# Patient Record
Sex: FEMALE | Race: ASIAN | NOT HISPANIC OR LATINO
[De-identification: names, ages, dates, MRNs, and addresses within clinical notes are randomized per-mention and may not be internally consistent; named-entity substitution may affect disease eponyms.]

---

## 2023-08-11 ENCOUNTER — APPOINTMENT (OUTPATIENT)
Dept: ORTHOPEDIC SURGERY | Facility: CLINIC | Age: 48
End: 2023-08-11
Payer: COMMERCIAL

## 2023-08-11 VITALS — BODY MASS INDEX: 32.32 KG/M2 | HEIGHT: 65 IN | WEIGHT: 194 LBS

## 2023-08-11 DIAGNOSIS — S83.412A SPRAIN OF MEDIAL COLLATERAL LIGAMENT OF LEFT KNEE, INITIAL ENCOUNTER: ICD-10-CM

## 2023-08-11 DIAGNOSIS — R73.03 PREDIABETES.: ICD-10-CM

## 2023-08-11 DIAGNOSIS — I10 ESSENTIAL (PRIMARY) HYPERTENSION: ICD-10-CM

## 2023-08-11 PROBLEM — Z00.00 ENCOUNTER FOR PREVENTIVE HEALTH EXAMINATION: Status: ACTIVE | Noted: 2023-08-11

## 2023-08-11 PROCEDURE — 73030 X-RAY EXAM OF SHOULDER: CPT | Mod: RT

## 2023-08-11 PROCEDURE — 73562 X-RAY EXAM OF KNEE 3: CPT | Mod: LT

## 2023-08-11 PROCEDURE — 99204 OFFICE O/P NEW MOD 45 MIN: CPT

## 2023-08-11 RX ORDER — DICLOFENAC SODIUM 75 MG/1
75 TABLET, DELAYED RELEASE ORAL
Qty: 60 | Refills: 1 | Status: ACTIVE | COMMUNITY
Start: 2023-08-11 | End: 1900-01-01

## 2023-08-11 NOTE — HISTORY OF PRESENT ILLNESS
[10] : 10 [0] : 0 [Dull/Aching] : dull/aching [Localized] : localized [Sharp] : sharp [Intermittent] : intermittent [Full time] : Work status: full time [de-identified] : 08/11/2023: 48 year old female here for eval of L knee and R shoulder. [place] the beach, knocked down by a wave and injured her left knee on 8/6/23, pain has gotten better but  to the touch. Also shoulder pain from bracing her fall when she fell at the beach. shoulder pain worsened last night. pain with rom. taking motrin 600 with mild help.   works as nurse - pain worse with work  [] : Post Surgical Visit: no [FreeTextEntry1] : Lt knee [FreeTextEntry3] : 8/6/23 [de-identified] : movement, pressure

## 2023-08-11 NOTE — IMAGING
[Right] : right shoulder [Left] : left knee [de-identified] : Left Knee Exam:                         	   General: no distress, no ligamentous laxity Skin: small abrasion over anterior knee Inspection:   Effusion: (mild)  Malalignment: (-)  Swelling: (-)  Quad atrophy: (-)  J-sign: (-) ROM:   0 - 125 degrees of flexion. Tenderness:   MJLT: (+)  MFC. (+)  LJLT: (-)  Medial patellar facet tenderness: -  Lateral patellar facet tenderness: -  Crepitus: (+)  Patellar grind tenderness: (-)  Patellar tendon: (-) Stability:   Lachman: (-)  Varus/Valgus instability: (-)   + Medial pain with valgus  stress  Posterior drawer: (-)  Patellar translation: wnl Additional tests:   McMurrays test: (+)  Patellar apprehension: (-)  Patellar tilt: (-)  Tight lateral retinaculum: (-) Strength: 5/5 Q/H/TA/GS/EHL Neuro: In tact to light touch throughout, DTR's wnl Vascularity: Extremity warm and well perfused Gait: mildly antalgic    ----------------------------------------------------------------------------  Right shoulder exam:   Inspection: no obvious deformity, no obvious masses, no swelling, no effusion, no atrophy ROM:     FF: 165 painful    ER: 45 painful     Tenderness:    (+) Anterior/Biceps:     (-) Posterior    (+) Lateral    (-) Trapezius    (-) Scapula    (+) AC joint    (-) Crepitus with ROM Additional tests:     (+) marcos    (+) Hawkin's    (+) Zaldivar's    (+) Speed    (-) Cross chest adduction Strength:     FF: 5/5 painful    ER: 5/5 painful    IR: 5/5    Biceps: 5/5    Triceps: 5/5    Distal: 5/5 Neuro: In tact to light touch throughout Vascularity: Extremity warm and well perfused   [FreeTextEntry1] : no obvious fracture, subluxation or malalignment  [FreeTextEntry9] : no obvious fracture, subluxation or malalignment

## 2023-08-11 NOTE — DISCUSSION/SUMMARY
[de-identified] : Reviewed Xrays, discussed diagnosis, answered questions Reassurance, Cryotherapy, activity mod, rest - diclofenac - PT FU with sports in 4 wks

## 2023-08-24 ENCOUNTER — APPOINTMENT (OUTPATIENT)
Dept: ORTHOPEDIC SURGERY | Facility: CLINIC | Age: 48
End: 2023-08-24
Payer: COMMERCIAL

## 2023-08-24 VITALS — HEIGHT: 65 IN | WEIGHT: 194 LBS | BODY MASS INDEX: 32.32 KG/M2

## 2023-08-24 DIAGNOSIS — M23.92 UNSPECIFIED INTERNAL DERANGEMENT OF LEFT KNEE: ICD-10-CM

## 2023-08-24 DIAGNOSIS — M75.51 BURSITIS OF RIGHT SHOULDER: ICD-10-CM

## 2023-08-24 DIAGNOSIS — Z78.9 OTHER SPECIFIED HEALTH STATUS: ICD-10-CM

## 2023-08-24 PROCEDURE — 99203 OFFICE O/P NEW LOW 30 MIN: CPT

## 2023-08-24 RX ORDER — TIRZEPATIDE 5 MG/.5ML
5 INJECTION, SOLUTION SUBCUTANEOUS
Refills: 0 | Status: ACTIVE | COMMUNITY

## 2023-08-24 RX ORDER — LOSARTAN POTASSIUM 25 MG/1
25 TABLET, FILM COATED ORAL
Refills: 0 | Status: ACTIVE | COMMUNITY

## 2023-08-24 RX ORDER — METOPROLOL SUCCINATE 25 MG/1
25 TABLET, EXTENDED RELEASE ORAL
Refills: 0 | Status: ACTIVE | COMMUNITY

## 2023-08-24 NOTE — HISTORY OF PRESENT ILLNESS
[8] : 8 [0] : 0 [Localized] : localized [Sharp] : sharp [Full time] : Work status: full time [] : Post Surgical Visit: no [FreeTextEntry1] : ALIX galicia [FreeTextEntry3] : 8/6/23 [FreeTextEntry5] : Pt is a 47 y/o F here today 3 wks s/p a trip and fall at the beach on 8/6/23. Pt went to [facility] On 8/11/23 and had XR. Pt was given Rx Diclofinac and told to start physical therapy.  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

## 2023-08-24 NOTE — ASSESSMENT
[FreeTextEntry1] : Traumatic left knee and right shoulder injury after a fall at the beach.  The shoulder feels significantly better after anti-inflammatory medication but the left knee remains very symptomatic.  Medial joint pain persists with intermittent mechanical symptoms.  Advised MRI to evaluate medial meniscus tear.  Follow-up to review

## 2023-08-24 NOTE — IMAGING
[de-identified] : No swelling, no ecchymosis, no deformity, no scapular winging. No tenderness to palpation over shoulder, AC joint or trapezius. Forward flexion to 180 degrees; external rotation to 90 degrees (with shoulder abducted); internal rotation to 70 degrees (with shoulder abducted); extension 60 degrees;  adduction 30 degrees. 5/5 supraspinatus, infraspinatus and subscapularis Negative Mahan test, negative impingement sign, negative Brandt test. Speeds and yergason negative Motor and sensory intact distally  LEFT KNEE Mild effusion, no warmth, no ecchymosis Medial joint line tenderness to palpation Range of motion 0-130 5/5 quadriceps and hamstring strength Positive Baljnider No varus or valgus instability, negative lachman Motor and sensory intact distally Mildly antalgic gait